# Patient Record
Sex: FEMALE | Race: BLACK OR AFRICAN AMERICAN
[De-identification: names, ages, dates, MRNs, and addresses within clinical notes are randomized per-mention and may not be internally consistent; named-entity substitution may affect disease eponyms.]

---

## 2017-10-13 ENCOUNTER — HOSPITAL ENCOUNTER (OUTPATIENT)
Dept: HOSPITAL 17 - PHSDC | Age: 47
Discharge: HOME | End: 2017-10-13
Payer: COMMERCIAL

## 2017-10-13 VITALS
DIASTOLIC BLOOD PRESSURE: 81 MMHG | RESPIRATION RATE: 16 BRPM | TEMPERATURE: 98 F | SYSTOLIC BLOOD PRESSURE: 138 MMHG | OXYGEN SATURATION: 95 %

## 2017-10-13 VITALS — BODY MASS INDEX: 31.31 KG/M2 | WEIGHT: 199.52 LBS | HEIGHT: 67 IN

## 2017-10-13 VITALS — HEART RATE: 60 BPM

## 2017-10-13 DIAGNOSIS — I10: ICD-10-CM

## 2017-10-13 DIAGNOSIS — E11.9: ICD-10-CM

## 2017-10-13 DIAGNOSIS — Z79.4: ICD-10-CM

## 2017-10-13 DIAGNOSIS — M65.331: Primary | ICD-10-CM

## 2017-10-13 PROCEDURE — 01810 ANES PX NRV MUSC F/ARM WRST: CPT

## 2017-10-13 PROCEDURE — 26055 INCISE FINGER TENDON SHEATH: CPT

## 2017-10-13 PROCEDURE — 82948 REAGENT STRIP/BLOOD GLUCOSE: CPT

## 2017-10-13 NOTE — MP
cc:

KIRBY DAVIDSON M.D.

****

 

 

DATE OF SURGERY

10/13/2017

 

SURGEON

Dr. Viral Davidson.

 

PREOPERATIVE DIAGNOSIS

Right middle trigger finger.

 

POSTOPERATIVE DIAGNOSIS

Right middle trigger finger.

 

PROCEDURE

Right trigger finger release, middle finger.

 

PROCEDURE IN DETAIL

The patient was placed on an operating room stretcher only and the right hand

was prepped and draped in the usual sterile fashion.  Esmarch bandage was used

to exsanguinate the upper extremity and pneumatic tourniquet was inflated to a

pressure of 250.  The time-out was called and the patient's name, procedure,

location were fully verified.  A transverse incision was made over the flexor

tendon sheath proximal to the distal crease at the MP joint.  The wound was

taken down carefully through the subcutaneous tissues using sharp and blunt

dissection. Care was taken to retract only proximal and distal to avoid any

possible stretching of the neurovascular bundles. The flexor tendon sheath was

identified and then pierced with a sharp scissor and then longitudinally opened

with the same instrument.  After full release I was able to bring the flexor

tendon out of the wound and it did appear to be free. No triggering was

present. Small bleeding points were electrocauterized.  The skin edges were

then reapproximated with two sutures of 4-0 nylon.  Xeroform gauze was applied

over the wound followed by application of a bulky hand dressing.  The procedure

was tolerated well.  Sponge count, needle counts reported correct x2 and the

estimated blood loss was nil.  The patient was transferred to recovery unit in

satisfactory condition.

 

 

 

                              _________________________________

                              Kirby Davidson MD

 

 

 

[facility]EL

D:  10/13/2017/1:35 PM

T:  10/13/2017/2:02 PM

Visit #:  [id number]

Job #:  [id number]

## 2017-12-17 ENCOUNTER — HOSPITAL ENCOUNTER (OUTPATIENT)
Dept: HOSPITAL 17 - NEPE | Age: 47
Setting detail: OBSERVATION
LOS: 1 days | Discharge: HOME | End: 2017-12-18
Payer: COMMERCIAL

## 2017-12-17 VITALS
RESPIRATION RATE: 18 BRPM | HEART RATE: 88 BPM | OXYGEN SATURATION: 99 % | TEMPERATURE: 98.8 F | SYSTOLIC BLOOD PRESSURE: 161 MMHG | DIASTOLIC BLOOD PRESSURE: 82 MMHG

## 2017-12-17 VITALS — HEIGHT: 67 IN | BODY MASS INDEX: 31.14 KG/M2 | WEIGHT: 198.42 LBS

## 2017-12-17 VITALS
SYSTOLIC BLOOD PRESSURE: 146 MMHG | DIASTOLIC BLOOD PRESSURE: 81 MMHG | OXYGEN SATURATION: 97 % | RESPIRATION RATE: 18 BRPM | HEART RATE: 60 BPM

## 2017-12-17 VITALS
SYSTOLIC BLOOD PRESSURE: 152 MMHG | HEART RATE: 54 BPM | RESPIRATION RATE: 16 BRPM | DIASTOLIC BLOOD PRESSURE: 77 MMHG | OXYGEN SATURATION: 95 %

## 2017-12-17 VITALS — OXYGEN SATURATION: 97 %

## 2017-12-17 DIAGNOSIS — E11.9: ICD-10-CM

## 2017-12-17 DIAGNOSIS — R07.9: Primary | ICD-10-CM

## 2017-12-17 DIAGNOSIS — K21.9: ICD-10-CM

## 2017-12-17 DIAGNOSIS — Z82.49: ICD-10-CM

## 2017-12-17 DIAGNOSIS — Z85.820: ICD-10-CM

## 2017-12-17 DIAGNOSIS — Z79.84: ICD-10-CM

## 2017-12-17 DIAGNOSIS — F40.240: ICD-10-CM

## 2017-12-17 DIAGNOSIS — Z90.710: ICD-10-CM

## 2017-12-17 DIAGNOSIS — E78.5: ICD-10-CM

## 2017-12-17 DIAGNOSIS — R94.31: ICD-10-CM

## 2017-12-17 DIAGNOSIS — I10: ICD-10-CM

## 2017-12-17 LAB
ALP SERPL-CCNC: 64 U/L (ref 45–117)
ALT SERPL-CCNC: 17 U/L (ref 10–53)
ANION GAP SERPL CALC-SCNC: 8 MEQ/L (ref 5–15)
APTT BLD: 25.2 SEC (ref 24.3–30.1)
AST SERPL-CCNC: 18 U/L (ref 15–37)
BASOPHILS # BLD AUTO: 0.1 TH/MM3 (ref 0–0.2)
BASOPHILS NFR BLD: 0.9 % (ref 0–2)
BILIRUB SERPL-MCNC: 0.3 MG/DL (ref 0.2–1)
BUN SERPL-MCNC: 15 MG/DL (ref 7–18)
CHLORIDE SERPL-SCNC: 106 MEQ/L (ref 98–107)
CK MB SERPL-MCNC: (no result) NG/ML (ref 0.5–3.6)
CK SERPL-CCNC: 153 U/L (ref 26–192)
EOSINOPHIL # BLD: 0.1 TH/MM3 (ref 0–0.4)
EOSINOPHIL NFR BLD: 1.3 % (ref 0–4)
ERYTHROCYTE [DISTWIDTH] IN BLOOD BY AUTOMATED COUNT: 13.7 % (ref 11.6–17.2)
GFR SERPLBLD BASED ON 1.73 SQ M-ARVRAT: 82 ML/MIN (ref 89–?)
HCO3 BLD-SCNC: 26.3 MEQ/L (ref 21–32)
HCT VFR BLD CALC: 31.2 % (ref 35–46)
HEMO FLAGS: (no result)
INR PPP: 1 RATIO
LYMPHOCYTES # BLD AUTO: 3.1 TH/MM3 (ref 1–4.8)
LYMPHOCYTES NFR BLD AUTO: 38.2 % (ref 9–44)
MAGNESIUM SERPL-MCNC: 2 MG/DL (ref 1.5–2.5)
MCH RBC QN AUTO: 27.4 PG (ref 27–34)
MCHC RBC AUTO-ENTMCNC: 32.3 % (ref 32–36)
MCV RBC AUTO: 84.9 FL (ref 80–100)
MONOCYTES NFR BLD: 5.6 % (ref 0–8)
NEUTROPHILS # BLD AUTO: 4.4 TH/MM3 (ref 1.8–7.7)
NEUTROPHILS NFR BLD AUTO: 54 % (ref 16–70)
PLATELET # BLD: 358 TH/MM3 (ref 150–450)
POTASSIUM SERPL-SCNC: 3.8 MEQ/L (ref 3.5–5.1)
PROTHROMBIN TIME: 10.1 SEC (ref 9.8–11.6)
RBC # BLD AUTO: 3.67 MIL/MM3 (ref 4–5.3)
SODIUM SERPL-SCNC: 140 MEQ/L (ref 136–145)
WBC # BLD AUTO: 8.1 TH/MM3 (ref 4–11)

## 2017-12-17 PROCEDURE — A9502 TC99M TETROFOSMIN: HCPCS

## 2017-12-17 PROCEDURE — 82552 ASSAY OF CPK IN BLOOD: CPT

## 2017-12-17 PROCEDURE — 93005 ELECTROCARDIOGRAM TRACING: CPT

## 2017-12-17 PROCEDURE — 99285 EMERGENCY DEPT VISIT HI MDM: CPT

## 2017-12-17 PROCEDURE — 80053 COMPREHEN METABOLIC PANEL: CPT

## 2017-12-17 PROCEDURE — 82550 ASSAY OF CK (CPK): CPT

## 2017-12-17 PROCEDURE — 71010: CPT

## 2017-12-17 PROCEDURE — G0378 HOSPITAL OBSERVATION PER HR: HCPCS

## 2017-12-17 PROCEDURE — 96375 TX/PRO/DX INJ NEW DRUG ADDON: CPT

## 2017-12-17 PROCEDURE — 85610 PROTHROMBIN TIME: CPT

## 2017-12-17 PROCEDURE — 78452 HT MUSCLE IMAGE SPECT MULT: CPT

## 2017-12-17 PROCEDURE — 96374 THER/PROPH/DIAG INJ IV PUSH: CPT

## 2017-12-17 PROCEDURE — 96372 THER/PROPH/DIAG INJ SC/IM: CPT

## 2017-12-17 PROCEDURE — 93017 CV STRESS TEST TRACING ONLY: CPT

## 2017-12-17 PROCEDURE — 83735 ASSAY OF MAGNESIUM: CPT

## 2017-12-17 PROCEDURE — 85025 COMPLETE CBC W/AUTO DIFF WBC: CPT

## 2017-12-17 PROCEDURE — 85730 THROMBOPLASTIN TIME PARTIAL: CPT

## 2017-12-17 PROCEDURE — 83690 ASSAY OF LIPASE: CPT

## 2017-12-17 PROCEDURE — 84484 ASSAY OF TROPONIN QUANT: CPT

## 2017-12-17 PROCEDURE — 82948 REAGENT STRIP/BLOOD GLUCOSE: CPT

## 2017-12-17 RX ADMIN — NITROGLYCERIN SCH MG: 0.4 TABLET SUBLINGUAL at 20:50

## 2017-12-17 RX ADMIN — NITROGLYCERIN SCH MG: 0.4 TABLET SUBLINGUAL at 20:40

## 2017-12-17 NOTE — PD
HPI


Chief Complaint:  Chest Pain


Time Seen by Provider:  20:30


Travel History


International Travel<30 days:  No


Contact w/Intl Traveler<30days:  No


Traveled to known affect area:  No





History of Present Illness


HPI


Patient is a 47-year-old female presenting to emergency department for 

evaluation of chest pain.  Patient states started earlier today, she initially 

presented to the [facility] emergency Department where she had an EKG and labs 

performed, they gave her the option of being admitted or followed up with 

cardiology as outpatient.  She states that she felt well enough to follow up 

however 45 minutes prior to arrival to [place] she began to have midsternal 

chest pain that radiates to her left arm again.  She states that she just did 

not feel good and didn't think she would make it to the other hospital.  She 

denies any nausea, vomiting, diaphoresis, headache, palpitations.  She does 

report that she felt dizzy.  Patient was discharged from [facility] at 8:00 this 

evening.  She does report a family history of early heart disease, she states 

also her parents  in their late 40s, early 50s from a heart attack.  

Patient does not smoke, she has a glass of wine occasionally denies any illicit 

drug use.  Her past medical history is significant for hyperlipidemia, type 2 

diabetes, hypertension.  She was given 4 baby aspirin in [facility].





[facility]


Past Medical History


Cancer:  Yes (MELANOMA )


High Cholesterol:  Yes


Diabetes:  Yes


Patient Takes Glucophage:  Yes


GERD:  Yes


Hypertension:  Yes


Psychiatric:  Yes (CLAUSTROPHOBIC)


Immunizations Current:  Yes


Pregnant?:  Not Pregnant





Past Surgical History


Gynecologic Surgery:  Yes (PARTIAL HYSTERECTOMY)


Hysterectomy:  Yes (PARTIAL)


Other Surgery:  Yes (CYST REMOVAL RIGHT WRIST)





Social History


Alcohol Use:  No


Tobacco Use:  No


Substance Use:  No





Allergies-Medications


(Allergen,Severity, Reaction):  


Coded Allergies:  


     acetaminophen (Unverified  Allergy, Intermediate, VOMITTING, 17)


 PATIENT DENIES


     ondansetron (Unverified  Allergy, Intermediate, HIVES, 17)


     propoxyphene (Unverified  Allergy, Intermediate, VOMITTING, 17)


 PATIENT DENIES


     morphine (Unverified  Adverse Reaction, Mild, VOMITTING, 17)


Uncoded Allergies:  


     STERI STRIPS (Allergy, Severe, BLISTERS, 10/13/17)


Reported Meds & Prescriptions





Reported Meds & Active Scripts


Active


Reported


Simvastatin 40 Mg Tab 40 Mg PO HS


Lantus Inj (Insulin Glargine) 1,000 Unit/10 Ml Vial 1 Units SQ HS


Metformin (Metformin HCl) 850 Mg Tab 850 Mg PO BIDPC


Metoprolol Tartrate 100 Mg Tab 100 Mg PO DAILY


Lisinopril 40 Mg Tab 40 Mg PO DAILY








Review of Systems


Except as stated in HPI:  all other systems reviewed are Neg


General / Constitutional:  No: Fever, Chills


Eyes:  No: Blurred Vision


HENT:  No: Headaches


Cardiovascular:  Positive: Chest Pain or Discomfort, No: Dyspnea on exertion


Respiratory:  No: Shortness of Breath


Gastrointestinal:  No: Nausea, Abdominal Pain


Musculoskeletal:  No: Myalgias





Physical Exam


Narrative


GENERAL: Well-developed, well-nourished, alert  female.  

Resting comfortably in no acute distress.


SKIN: Warm and dry.


HEAD: Atraumatic. Normocephalic. 


EYES: Pupils equal and round. No scleral icterus. No injection or drainage. 


ENT: No nasal bleeding or discharge.  Mucous membranes pink and moist.


NECK: Trachea midline. No JVD. 


CARDIOVASCULAR: Regular rate and rhythm.  


RESPIRATORY: No accessory muscle use. Clear to auscultation. Breath sounds 

equal bilaterally. 


GASTROINTESTINAL: Abdomen soft, non-tender, nondistended. Hepatic and splenic 

margins not palpable. 


MUSCULOSKELETAL: Extremities without clubbing, cyanosis, or edema. No obvious 

deformities. 


NEUROLOGICAL: Awake and alert. No obvious cranial nerve deficits.  Motor 

grossly within normal limits. Five out of 5 muscle strength in the arms and 

legs.  Normal speech.


PSYCHIATRIC: Appropriate mood and affect; insight and judgment normal.





Data


Data


Last Documented VS





Vital Signs








  Date Time  Temp Pulse Resp B/P (MAP) Pulse Ox O2 Delivery O2 Flow Rate FiO2


 


17 20:53  60 18 146/81 (102) 97 Room Air  


 


17 20:19 98.8       








Orders





 Orders


Electrocardiogram (17 20:36)


Ckmb (Isoenzyme) Profile (17 20:36)


Complete Blood Count With Diff (17 20:36)


Comprehensive Metabolic Panel (17 20:36)


Magnesium (Mg) (17 20:36)


Prothrombin Time / Inr (Pt) (17 20:36)


Act Partial Throm Time (Ptt) (17 20:36)


Troponin I (17 20:36)


Lipase (17 20:36)


Chest, Single Ap (17 20:36)


Ecg Monitoring (17 20:36)


Bilateral Bp Monitoring (17 20:36)


Iv Access Insert/Monitor (17 20:36)


Oximetry (17 20:36)


Oxygen Administration (17 20:36)


Sodium Chloride 0.9% Flush (Ns Flush) (17 20:45)


Nitroglycerin Sl (Nitrostat Sl) (17 20:45)


Morphine Inj (Morphine Inj) (17 22:15)


Prochlorperazine Inj (Compazine Inj) (17 22:15)


CKMB (17 21:44)


CKMB% (17 21:44)


Admit Order (Ed Use Only) (17 23:06)





Labs





Laboratory Tests








Test


  17


21:44


 


White Blood Count 8.1 TH/MM3 


 


Red Blood Count 3.67 MIL/MM3 


 


Hemoglobin 10.1 GM/DL 


 


Hematocrit 31.2 % 


 


Mean Corpuscular Volume 84.9 FL 


 


Mean Corpuscular Hemoglobin 27.4 PG 


 


Mean Corpuscular Hemoglobin


Concent 32.3 % 


 


 


Red Cell Distribution Width 13.7 % 


 


Platelet Count 358 TH/MM3 


 


Mean Platelet Volume 7.3 FL 


 


Neutrophils (%) (Auto) 54.0 % 


 


Lymphocytes (%) (Auto) 38.2 % 


 


Monocytes (%) (Auto) 5.6 % 


 


Eosinophils (%) (Auto) 1.3 % 


 


Basophils (%) (Auto) 0.9 % 


 


Neutrophils # (Auto) 4.4 TH/MM3 


 


Lymphocytes # (Auto) 3.1 TH/MM3 


 


Monocytes # (Auto) 0.5 TH/MM3 


 


Eosinophils # (Auto) 0.1 TH/MM3 


 


Basophils # (Auto) 0.1 TH/MM3 


 


CBC Comment DIFF FINAL 


 


Differential Comment  


 


Prothrombin Time 10.1 SEC 


 


Prothromb Time International


Ratio 1.0 RATIO 


 


 


Activated Partial


Thromboplast Time 25.2 SEC 


 


 


Blood Urea Nitrogen 15 MG/DL 


 


Creatinine 0.89 MG/DL 


 


Random Glucose 88 MG/DL 


 


Total Protein 7.9 GM/DL 


 


Albumin 3.7 GM/DL 


 


Calcium Level 8.7 MG/DL 


 


Magnesium Level 2.0 MG/DL 


 


Alkaline Phosphatase 64 U/L 


 


Aspartate Amino Transf


(AST/SGOT) 18 U/L 


 


 


Alanine Aminotransferase


(ALT/SGPT) 17 U/L 


 


 


Total Bilirubin 0.3 MG/DL 


 


Sodium Level 140 MEQ/L 


 


Potassium Level 3.8 MEQ/L 


 


Chloride Level 106 MEQ/L 


 


Carbon Dioxide Level 26.3 MEQ/L 


 


Anion Gap 8 MEQ/L 


 


Estimat Glomerular Filtration


Rate 82 ML/MIN 


 


 


Total Creatine Kinase 153 U/L 


 


Creatine Kinase MB


  LESS THAN 0.5


NG/ML


 


Troponin I


  LESS THAN 0.02


NG/ML


 


Lipase 138 U/L 











MDM


Medical Decision Making


Medical Screen Exam Complete:  Yes


Emergency Medical Condition:  Yes


Interpretation(s)





Last Impressions








Chest X-Ray 17 Signed





Impressions: 





 Service Date/Time:  2017 21:00 - CONCLUSION:  No acute 





 cardiopulmonary process. There is suspected chronic change at the distal right 





 clavicle.     Jesse Junior MD 








Vital Signs








  Date Time  Temp Pulse Resp B/P (MAP) Pulse Ox O2 Delivery O2 Flow Rate FiO2


 


17 20:53  60 18 146/81 (102) 97 Room Air  


 


17 20:19 98.8 88 18 161/82 (108) 99 Room Air  








Differential Diagnosis


ACS versus USA versus NSTEMI versus metabolic abnormality versus other


Narrative Course


Patient is a 47-year-old female presenting for evaluation of chest pain.  She 

was just discharged from [facility] at 8 PM, the chest pain returned and 

she presented here.  Patient was hypertensive on arrival, labs and imaging 

ordered and pending.  Nitroglycerin sublingually was ordered 2 doses for chest 

pain.  She was already given 4 baby aspirin at [facility] this morning per her 

report.  Patient has significant family history as well as her known history of 

hypertension and type 2 diabetes, hyperlipidemia provider at increased risk for 

cardiovascular disease.  Visual likely be placed and chest pain Center overnight

, patient is agreeable to this plan.  Initial EKG shows him, possible left 

atrial enlargement.  Ventricular rate is 62.


Chest x-ray shows no acute disease, suspected right old clavicle fracture.


 patient was reassessed, nitroglycerin did not alleviate patient's chest 

pain.  Morphine and Compazine ordered for pain.  Morphine is listed as an 

allergy however patient states it makes her feel nauseated.


Chemistry and enzymes are pending, care of pt transferred to Dr. Mejia who 

will determine pt's disposition. Pt agreeable to [facility] admit.











Narcisa Meyer Dec 17, 2017 21:11

## 2017-12-17 NOTE — RADRPT
EXAM DATE/TIME:  12/17/2017 21:00 

 

[facility] COMPARISON:     

No previous studies available for comparison.

 

                     

INDICATIONS :     

Chest Pain

                     

 

MEDICAL HISTORY :     

None.          

 

SURGICAL HISTORY :     

None.   

 

ENCOUNTER:     

Initial                                        

 

ACUITY:     

1 day      

 

PAIN SCORE:     

6/10

 

LOCATION:     

Bilateral chest 

 

FINDINGS:     

A single view of the chest demonstrates the lungs to be symmetrically aerated without evidence of mas
s, infiltrate or effusion.  The cardiomediastinal contours are unremarkable. There appears to be abse
nce of the distal right clavicle which is likely from prior injury. There is widening of the acromioc
lavicular joint on the right.

 

CONCLUSION:     

No acute cardiopulmonary process. There is suspected chronic change at the distal right clavicle.

 

 

 

 Jesse Junior MD on December 17, 2017 at 21:13           

Board Certified Radiologist.

 This report was verified electronically.

## 2017-12-17 NOTE — PD
HPI


Chief Complaint:  Chest Pain


Time Seen by Provider:  20:28


Travel History


International Travel<30 days:  No


Contact w/Intl Traveler<30days:  No


Traveled to known affect area:  No





[facility]


Past Medical History


Cancer:  Yes (MELANOMA 2002)


Cardiovascular Problems:  No


High Cholesterol:  Yes


Diabetes:  Yes


Patient Takes Glucophage:  Yes


Endocrine:  No


GERD:  Yes


Genitourinary:  No


Hepatitis:  No


Hiatal Hernia:  No


Hypertension:  Yes


Immune Disorder:  No


Musculoskeletal:  No


Neurologic:  No


Psychiatric:  Yes (CLAUSTROPHOBIC)


Reproductive:  No


Respiratory:  No


Immunizations Current:  Yes


Thyroid Disease:  No


Pregnant?:  Not Pregnant





Past Surgical History


Abdominal Surgery:  No


AICD:  No


Cardiac Surgery:  No


Ear Surgery:  No


Endocrine Surgery:  No


Eye Surgery:  No


Genitourinary Surgery:  No


Gynecologic Surgery:  Yes (PARTIAL HYSTERECTOMY)


Hysterectomy:  Yes (PARTIAL)


Joint Replacement:  No


Oral Surgery:  No


Pacemaker:  No


Thoracic Surgery:  No


Other Surgery:  Yes (CYST REMOVAL RIGHT WRIST)





Social History


Alcohol Use:  No


Tobacco Use:  No


Substance Use:  No





Allergies-Medications


(Allergen,Severity, Reaction):  


Coded Allergies:  


     acetaminophen (Unverified  Allergy, Intermediate, VOMITTING, 12/17/17)


 PATIENT DENIES


     ondansetron (Unverified  Allergy, Intermediate, HIVES, 12/17/17)


     propoxyphene (Unverified  Allergy, Intermediate, VOMITTING, 12/17/17)


 PATIENT DENIES


     morphine (Unverified  Adverse Reaction, Mild, VOMITTING, 12/17/17)


Uncoded Allergies:  


     STERI STRIPS (Allergy, Severe, BLISTERS, 10/13/17)


Reported Meds & Prescriptions





Reported Meds & Active Scripts


Active


Reported


Simvastatin 40 Mg Tab 40 Mg PO HS


Lantus Inj (Insulin Glargine) 1,000 Unit/10 Ml Vial 1 Units SQ HS


Metformin (Metformin HCl) 850 Mg Tab 850 Mg PO BIDPC


Metoprolol Tartrate 100 Mg Tab 100 Mg PO DAILY


Lisinopril 40 Mg Tab 40 Mg PO DAILY








Data


Data


Last Documented VS





Vital Signs








  Date Time  Temp Pulse Resp B/P (MAP) Pulse Ox O2 Delivery O2 Flow Rate FiO2


 


12/17/17 20:19 98.8 88 18 161/82 (108) 99 Room Air  








Orders





 Orders


Electrocardiogram (12/17/17 20:36)


Ckmb (Isoenzyme) Profile (12/17/17 20:36)


Complete Blood Count With Diff (12/17/17 20:36)


Comprehensive Metabolic Panel (12/17/17 20:36)


Magnesium (Mg) (12/17/17 20:36)


Prothrombin Time / Inr (Pt) (12/17/17 20:36)


Act Partial Throm Time (Ptt) (12/17/17 20:36)


Troponin I (12/17/17 20:36)


Lipase (12/17/17 20:36)


Chest, Single Ap (12/17/17 20:36)


Ecg Monitoring (12/17/17 20:36)


Bilateral Bp Monitoring (12/17/17 20:36)


Iv Access Insert/Monitor (12/17/17 20:36)


Oximetry (12/17/17 20:36)


Oxygen Administration (12/17/17 20:36)


Sodium Chloride 0.9% Flush (Ns Flush) (12/17/17 20:45)


Nitroglycerin Sl (Nitrostat Sl) (12/17/17 20:45)














Michelle Mejia MD Dec 17, 2017 20:46

## 2017-12-17 NOTE — PD
Physical Exam


Narrative


General: 


The patient is a well-developed well-nourished female in no acute distress. 





Head and Neck exam: 


Head is normocephalic atraumatic. 


Eyes: Pupils are equal round and reactive to light. 


Nose: Midline septum with pink mucous membranes 


Mouth: Dentition unremarkable. Moist mucus membranes. Posterior oropharynx is 

not erythematous. No tonsillar hypertrophy. Uvula midline. Airway patent. 


Neck: No palpable lymphadenopathy. No nuchal rigidity. No thyromegaly. 





Cardiovascular: 


Regular rate and rhythm without murmurs, gallops, or rubs. No pulse deficit to 

the extremities on simultaneous auscultation and palpation of her radial 

artery. 





Lungs: 


Clear to auscultation bilaterally. No wheezes, rhonchi, or rales.


 


Abdomen:


Soft, without tenderness to palpation in all 4 quadrants of the abdomen. No 

guarding, rebound, or rigidity.  Normal bowel sounds are audible.  No 

tenderness on palpation of McBurney's point.





Extremities: 


No clubbing, cyanosis, or edema. 2+ pulses in all 4 extremities.  Tenderness on 

palpation.





Neurologic Exam: Grossly nonfocal.





Skin Exam: No rash noted. Intact skin that is warm and dry.





Data


Data


Last Documented VS





Vital Signs








  Date Time  Temp Pulse Resp B/P (MAP) Pulse Ox O2 Delivery O2 Flow Rate FiO2


 


12/17/17 20:53  60 18 146/81 (102) 97 Room Air  


 


12/17/17 20:19 98.8       








Orders





 Orders


Electrocardiogram (12/17/17 20:36)


Ckmb (Isoenzyme) Profile (12/17/17 20:36)


Complete Blood Count With Diff (12/17/17 20:36)


Comprehensive Metabolic Panel (12/17/17 20:36)


Magnesium (Mg) (12/17/17 20:36)


Prothrombin Time / Inr (Pt) (12/17/17 20:36)


Act Partial Throm Time (Ptt) (12/17/17 20:36)


Troponin I (12/17/17 20:36)


Lipase (12/17/17 20:36)


Chest, Single Ap (12/17/17 20:36)


Ecg Monitoring (12/17/17 20:36)


Bilateral Bp Monitoring (12/17/17 20:36)


Iv Access Insert/Monitor (12/17/17 20:36)


Oximetry (12/17/17 20:36)


Oxygen Administration (12/17/17 20:36)


Sodium Chloride 0.9% Flush (Ns Flush) (12/17/17 20:45)


Nitroglycerin Sl (Nitrostat Sl) (12/17/17 20:45)


Morphine Inj (Morphine Inj) (12/17/17 22:15)


Prochlorperazine Inj (Compazine Inj) (12/17/17 22:15)


CKMB (12/17/17 21:44)


CKMB% (12/17/17 21:44)


Admit Order (Ed Use Only) (12/17/17 23:06)





Labs





Laboratory Tests








Test


  12/17/17


21:44


 


White Blood Count 8.1 TH/MM3 


 


Red Blood Count 3.67 MIL/MM3 


 


Hemoglobin 10.1 GM/DL 


 


Hematocrit 31.2 % 


 


Mean Corpuscular Volume 84.9 FL 


 


Mean Corpuscular Hemoglobin 27.4 PG 


 


Mean Corpuscular Hemoglobin


Concent 32.3 % 


 


 


Red Cell Distribution Width 13.7 % 


 


Platelet Count 358 TH/MM3 


 


Mean Platelet Volume 7.3 FL 


 


Neutrophils (%) (Auto) 54.0 % 


 


Lymphocytes (%) (Auto) 38.2 % 


 


Monocytes (%) (Auto) 5.6 % 


 


Eosinophils (%) (Auto) 1.3 % 


 


Basophils (%) (Auto) 0.9 % 


 


Neutrophils # (Auto) 4.4 TH/MM3 


 


Lymphocytes # (Auto) 3.1 TH/MM3 


 


Monocytes # (Auto) 0.5 TH/MM3 


 


Eosinophils # (Auto) 0.1 TH/MM3 


 


Basophils # (Auto) 0.1 TH/MM3 


 


CBC Comment DIFF FINAL 


 


Differential Comment  


 


Prothrombin Time 10.1 SEC 


 


Prothromb Time International


Ratio 1.0 RATIO 


 


 


Activated Partial


Thromboplast Time 25.2 SEC 


 


 


Blood Urea Nitrogen 15 MG/DL 


 


Creatinine 0.89 MG/DL 


 


Random Glucose 88 MG/DL 


 


Total Protein 7.9 GM/DL 


 


Albumin 3.7 GM/DL 


 


Calcium Level 8.7 MG/DL 


 


Magnesium Level 2.0 MG/DL 


 


Alkaline Phosphatase 64 U/L 


 


Aspartate Amino Transf


(AST/SGOT) 18 U/L 


 


 


Alanine Aminotransferase


(ALT/SGPT) 17 U/L 


 


 


Total Bilirubin 0.3 MG/DL 


 


Sodium Level 140 MEQ/L 


 


Potassium Level 3.8 MEQ/L 


 


Chloride Level 106 MEQ/L 


 


Carbon Dioxide Level 26.3 MEQ/L 


 


Anion Gap 8 MEQ/L 


 


Estimat Glomerular Filtration


Rate 82 ML/MIN 


 


 


Total Creatine Kinase 153 U/L 


 


Creatine Kinase MB


  LESS THAN 0.5


NG/ML


 


Troponin I


  LESS THAN 0.02


NG/ML


 


Lipase 138 U/L 











[facility]


Medical Record Reviewed:  Yes


Supervised Visit with ROSALINA:  Yes


Interpretation(s)





Last Impressions








Chest X-Ray 12/17/17 2036 Signed





Impressions: 





 Service Date/Time:  Sunday, December 17, 2017 21:00 - CONCLUSION:  No acute 





 cardiopulmonary process. There is suspected chronic change at the distal right 





 clavicle.     Jesse Junior MD 








Differential Diagnosis


Acute coronary syndrome, versus acid reflux, versus anxiety disorder, versus 

pleurisy, versus costochondritis, versus musculoskeletal strain


Narrative Course


I, Dr. Mejia, have reviewed the advance practice practitioner's documentation 

and am in agreement, met with the patient face to face, made the diagnosis, and 

the medical decision making was done by me.  The patient was initially 

evaluated by Narcisa, the nurse practitioner.  Please see their complete history 

and physical.





*My assessment and Findings: The patient presents with chest pain that began





During the course of the patients emergency department visit, the patients 

history, examination, and differential diagnosis were reviewed with the 

patient. The patient was placed on a cardiac monitor with oximetry and frequent 

blood pressure monitoring. The patient had IV access obtained and blood work 

sent for analysis. 





The patient was initially provided sublingual nitroglycerin 2, Compazine 5 mg 

IV, morphine 2 mg IV.





The patient's case was checked out to me at the conclusion of Narcisa shift 

pending chemistry results.  She plan to admit the patient to the chest pain 

center once these results were completed.





The patients laboratory studies were reviewed and remarkable for a white count 

of 8.1, hemoglobin 10.1, platelets 358 with a normal differential, CMP is 

remarkable for a GFR of 82, initial set of cardiac enzymes are negative, lipase 

138, PT PTT within normal limits





Radiology studies were reviewed and remarkable for a chest x-ray that shows no 

acute cardiopulmonary disease, suspected chronic change of the distal right 

clavicle.





The patients results were discussed with the patient, including the plan of 

care. I explained that further testing and/ or monitoring is indicated based on 

the patients history, examination, and/ or laboratory findings. Therefore, I 

recommended admission for additional evaluation. The patient expressed 

understanding and was agreeable with this plan. The patient was admitted to the 

hospital in stable condition and sent to a bed under the care of chest pain 

center.


Diagnosis





 Primary Impression:  


 Chest pain, rule out acute myocardial infarction





Admitting Information


Admitting Physician Requests:  Observation











Michelle Mejia MD Dec 17, 2017 22:52

## 2017-12-18 VITALS
HEART RATE: 62 BPM | SYSTOLIC BLOOD PRESSURE: 121 MMHG | DIASTOLIC BLOOD PRESSURE: 76 MMHG | OXYGEN SATURATION: 99 % | RESPIRATION RATE: 18 BRPM | TEMPERATURE: 98 F

## 2017-12-18 VITALS
SYSTOLIC BLOOD PRESSURE: 113 MMHG | HEART RATE: 53 BPM | RESPIRATION RATE: 18 BRPM | DIASTOLIC BLOOD PRESSURE: 66 MMHG | OXYGEN SATURATION: 94 % | TEMPERATURE: 98.1 F

## 2017-12-18 VITALS
RESPIRATION RATE: 18 BRPM | DIASTOLIC BLOOD PRESSURE: 65 MMHG | OXYGEN SATURATION: 95 % | SYSTOLIC BLOOD PRESSURE: 115 MMHG | HEART RATE: 54 BPM | TEMPERATURE: 98.1 F

## 2017-12-18 VITALS — HEART RATE: 51 BPM

## 2017-12-18 VITALS — HEART RATE: 56 BPM

## 2017-12-18 LAB
CK MB SERPL-MCNC: 0.8 NG/ML (ref 0.5–3.6)
CK MB SERPL-MCNC: 0.8 NG/ML (ref 0.5–3.6)
CK SERPL-CCNC: 240 U/L (ref 26–192)
CK SERPL-CCNC: 247 U/L (ref 26–192)

## 2017-12-18 NOTE — HHI.DCPOC
Discharge Care Plan


Diagnosis:  


(1) Chest pain


(2) Hypertension


(3) Hyperlipidemia


(4) DM (diabetes mellitus)


Goals to Promote Your Health


* To prevent worsening of your condition and complications


* To maintain your health at the optimal level


Directions to Meet Your Goals


*** Take your medications as prescribed


*** Follow your dietary instruction


*** Follow activity as directed








*** Keep your appointments as scheduled


*** Take your immunizations and boosters as scheduled


*** If your symptoms worsen call your PCP, if no PCP go to Urgent Care Center 

or Emergency Room***


*** Smoking is Dangerous to Your Health. Avoid second hand smoke***


***Call the 24-hour hour crisis hotline for domestic abuse at 1-498.970.5609***











Heath Toney Dec 18, 2017 11:38

## 2017-12-18 NOTE — HHI.HP
[facility]


Primary Care Physician


Miguel Rodriguez MD


Chief Complaint


Chest pain


History of Present Illness


This is a 47-year-old female with history of hypertension, hyperlipidemia, and 

diabetes that presents to ED for evaluation of chest discomfort.  Patient 

states that she has had a central chest discomfort yesterday lasting about 5 

minutes.  She went [facility] for this, had labs and EKG and was 

discharged.  She states that within less than an hour of discharge she 

developed same discomfort in the center of her chest also lasting about 5 

minutes with shortness of breath and decided to get reevaluated.  No nausea or 

diaphoresis.  Found nothing in particular bring on the discomfort.  Nothing 

seemed to worsen it.  Nothing seemed to help.  Cannot recall prior cardiac 

evaluation.





Review of Systems


General: Patient denies fevers, chills recent, and recent travel


HEENT: Patient denies headache, sore throat, difficulty swallowing.  


Cardiovascular: Has the chest discomfort as mentioned above.  Denies sensation 

of heart beating rapidly or irregularly.  No syncope.  Denies diaphoresis.


Respiratory: Had shortness of breath.  Denies inspirational chest discomfort.  

Denies coughing wheezing or hemoptysis.  


GI: Patient denies nausea, vomiting, diarrhea, abdominal pain, bloody stools.


Musculoskeletal: Patient denies joint pain or edema.  Denies calf pain or edema.


Neurovascular: Patient denies numbness, tingling, weakness in extremities.  

Denies headache.


Endocrine: Denies polyuria and polydipsia.


Hematologic: Denies easy bruising.


Skin: Denies rash or itching.





Past Family Social History


Allergies:  


Coded Allergies:  


     acetaminophen (Unverified  Allergy, Intermediate, VOMITTING, 17)


 PATIENT DENIES


     ondansetron (Unverified  Allergy, Intermediate, HIVES, 17)


     propoxyphene (Unverified  Allergy, Intermediate, VOMITTING, 17)


 PATIENT DENIES


     morphine (Unverified  Adverse Reaction, Mild, VOMITTING, 17)


Uncoded Allergies:  


     STERI STRIPS (Allergy, Severe, BLISTERS, 10/13/17)


Past Medical History


Hypertension, hyperlipidemia, and diabetes.


Past Surgical History


Hysterectomy.  Excision of a melanoma right thigh .  Both knees.


Reported Medications





Reported Meds & Active Scripts


Active


Reported


Simvastatin 40 Mg Tab 40 Mg PO HS


Lantus Inj (Insulin Glargine) 1,000 Unit/10 Ml Vial 1 Units SQ HS


Metformin (Metformin HCl) 850 Mg Tab 850 Mg PO BIDPC


Metoprolol Tartrate 100 Mg Tab 100 Mg PO DAILY


Lisinopril 40 Mg Tab 40 Mg PO DAILY


Active Ordered Medications





Current Medications








 Medications


  (Trade)  Dose


 Ordered  Sig/Carol


 Route  Start Time


 Stop Time Status Last Admin


 


  (NS Flush)  2 ml  UNSCH  PRN


 IVF  17 20:45


     


 


 


  (NS Flush)  2 ml  UNSCH  PRN


 IV FLUSH  17 23:30


     


 


 


  (NS Flush)  2 ml  BID


 IV FLUSH  17 09:00


     


 


 


  (Lopressor)  100 mg  DAILY


 PO  17 09:00


   UNV  


 


 


 Non-Formulary


 Medication  40 mg  DAILY


 PO  17 09:00


   UNV  


 


 


 Non-Formulary


 Medication  40 mg  HS


 PO  17 21:00


   UNV  


 


 


  (Toradol Inj)  30 mg  ONCE  ONCE


 IVP  17 08:30


 17 08:31 UNV  


 


 


  (D50w (Vial) Inj)  50 ml  UNSCH  PRN


 IV PUSH  17 08:30


   UNV  


 


 


  (Glucagon Inj)  1 mg  UNSCH  PRN


 OTHER  17 08:30


   UNV  


 


 


  (NovoLOG


 SUPPLEMENTAL


 SCALE)  1  ACHS SLIDING  SCALE


 SQ  17 12:00


   UNV  


 








Family History


Both parents had CAD in their 40s.


Social History


She does not smoke.  Denies illicit drug use.  Denies alcohol abuse.





Physical Exam


Vital Signs





Vital Signs








  Date Time  Temp Pulse Resp B/P (MAP) Pulse Ox O2 Delivery O2 Flow Rate FiO2


 


17 04:48  56      


 


17 03:39 98.1 54 18 115/65 (82) 95   


 


17 01:21  51      


 


17 00:31 98.1 53 18 113/66 (82) 94   


 


17 23:58        


 


17 23:40     97   21


 


17 23:37  54 16 152/77 (102) 95 Room Air  


 


17 20:53  60 18 146/81 (102) 97 Room Air  


 


17 20:19 98.8 88 18 161/82 (108) 99 Room Air  








Physical Exam


GENERAL: This is a well-nourished, well-developed patient, in no apparent 

distress.  Patient speaks in clear complete sentences.  Patient is pleasant.


HEENT: Head is atraumatic and normocephalic.  Neck is supple without 

lymphadenopathy and trachea is midline.  No JVD or carotid bruits.


CARDIOVASCULAR: Regular rate and rhythm without murmurs, gallops, or rubs. 


RESPIRATORY: Clear to auscultation. Breath sounds equal bilaterally. No wheezes

, rales, or rhonchi.  Chest wall is nontender.  No use of accessory muscles.


GASTROINTESTINAL: Abdomen is nontender, nondistended.  Abdomen soft.  No 

obvious pulsatile mass or bruit.  No CVA tenderness.  Strong femoral pulses 

bilaterally.  Normal bowel sounds in all quadrants.


MUSCULOSKELETAL: Patient is moving upper and lower extremities freely.  No calf 

tenderness or edema, no Homans sign.  Strong pulses in upper and lower 

extremities.


NEUROLOGICAL: Patient is alert and oriented.  Cranial nerves 2-12 are grossly 

intact.  No focal deficits and speech is clear.


SKIN: No rash and turgor is normal.


Laboratory





Laboratory Tests








Test


  17


21:44 17


01:00 17


03:56


 


White Blood Count 8.1   


 


Red Blood Count 3.67   


 


Hemoglobin 10.1   


 


Hematocrit 31.2   


 


Mean Corpuscular Volume 84.9   


 


Mean Corpuscular Hemoglobin 27.4   


 


Mean Corpuscular Hemoglobin


Concent 32.3 


  


  


 


 


Red Cell Distribution Width 13.7   


 


Platelet Count 358   


 


Mean Platelet Volume 7.3   


 


Neutrophils (%) (Auto) 54.0   


 


Lymphocytes (%) (Auto) 38.2   


 


Monocytes (%) (Auto) 5.6   


 


Eosinophils (%) (Auto) 1.3   


 


Basophils (%) (Auto) 0.9   


 


Neutrophils # (Auto) 4.4   


 


Lymphocytes # (Auto) 3.1   


 


Monocytes # (Auto) 0.5   


 


Eosinophils # (Auto) 0.1   


 


Basophils # (Auto) 0.1   


 


CBC Comment DIFF FINAL   


 


Differential Comment    


 


Prothrombin Time 10.1   


 


Prothromb Time International


Ratio 1.0 


  


  


 


 


Activated Partial


Thromboplast Time 25.2 


  


  


 


 


Blood Urea Nitrogen 15   


 


Creatinine 0.89   


 


Random Glucose 88   


 


Total Protein 7.9   


 


Albumin 3.7   


 


Calcium Level 8.7   


 


Magnesium Level 2.0   


 


Alkaline Phosphatase 64   


 


Aspartate Amino Transf


(AST/SGOT) 18 


  


  


 


 


Alanine Aminotransferase


(ALT/SGPT) 17 


  


  


 


 


Total Bilirubin 0.3   


 


Sodium Level 140   


 


Potassium Level 3.8   


 


Chloride Level 106   


 


Carbon Dioxide Level 26.3   


 


Anion Gap 8   


 


Estimat Glomerular Filtration


Rate 82 


  


  


 


 


Total Creatine Kinase 153  247  240 


 


Creatine Kinase MB LESS THAN 0.5  0.8  0.8 


 


Troponin I LESS THAN 0.02  LESS THAN 0.02  LESS THAN 0.02 


 


Lipase 138   


 


Creatine Kinase MB %  0.3  0.3 








Result Diagram:  


17





Imaging





Last 24 hours Impressions








Chest X-Ray 17 Signed





Impressions: 





 Service Date/Time:  2017 21:00 - CONCLUSION:  No acute 





 cardiopulmonary process. There is suspected chronic change at the distal right 





 clavicle.     Jesse Junior MD 








Course


EKGs are sinus rhythm to sinus bradycardia without significant ST segment 

depressions or elevations.





Caprini VTE Risk Assessment


Caprini VTE Risk Assessment:  No/Low Risk (score <= 1)


Caprini Risk Assessment Model











 Point Value = 1          Point Value = 2  Point Value = 3  Point Value = 5


 


Age 41-60


Minor surgery


BMI > 25 kg/m2


Swollen legs


Varicose veins


Pregnancy or postpartum


History of unexplained or recurrent


   spontaneous 


Oral contraceptives or hormone


   replacement


Sepsis (< 1 month)


Serious lung disease, including


   pneumonia (< 1 month)


Abnormal pulmonary function


Acute myocardial infarction


Congestive heart failure (< 1 month)


History of inflammatory bowel disease


Medical patient at bed rest Age 61-74


Arthroscopic surgery


Major open surgery (> 45 min)


Laparoscopic surgery (> 45 min)


Malignancy


Confined to bed (> 72 hours)


Immobilizing plaster cast


Central venous access Age >= 75


History of VTE


Family history of VTE


Factor V Leiden


Prothrombin 14576D


Lupus anticoagulant


Anticardiolipin antibodies


Elevated serum homocysteine


Heparin-induced thrombocytopenia


Other congenital or acquired


   thrombophilia Stroke (< 1 month)


Elective arthroplasty


Hip, pelvis, or leg fracture


Acute spinal cord injury (< 1 month)








Prophylaxis Regimen











   Total Risk


Factor Score Risk Level Prophylaxis Regimen


 


0-1      Low Early ambulation


 


2 Moderate Order ONE of the following:


*Sequential Compression Device (SCD)


*Heparin 5000 units SQ BID


 


3-4 Higher Order ONE of the following medications:


*Heparin 5000 units SQ TID


*Enoxaparin/Lovenox 40 mg SQ daily (WT < 150 kg, CrCl > 30 mL/min)


*Enoxaparin/Lovenox 30 mg SQ daily (WT < 150 kg, CrCl > 10-29 mL/min)


*Enoxaparin/Lovenox 30 mg SQ BID (WT < 150 kg, CrCl > 30 mL/min)


AND/OR


*Sequential Compression Device (SCD)


 


5 or more Highest Order ONE of the following medications:


*Heparin 5000 units SQ TID (Preferred with Epidurals)


*Enoxaparin/Lovenox 40 mg SQ daily (WT < 150 kg, CrCl > 30 mL/min)


*Enoxaparin/Lovenox 30 mg SQ daily (WT < 150 kg, CrCl > 10-29 mL/min)


*Enoxaparin/Lovenox 30 mg SQ BID (WT < 150 kg, CrCl > 30 mL/min)


AND


*Sequential Compression Device (SCD)











Assessment and Plan


Assessment and Plan


* Chest pain: Patient has had serial cardiac enzymes and EKGs for ruling out 

purposes.  She has been seen by Dr. Jose Moon of cardiology in the chest 

pain center.  Patient will have a Lexiscan myocardial perfusion stress test and 

be discharged at that stress test is nonischemic.  Patient should follow-up 

with PCP and return to ED for interval issues.


* Diabetes: We'll hold her medication at this time.  Will be on sliding scale 

coverage.  Follow diabetic diet.  Resume medication at discharge.


* Hypertension: Continue current medication.


* Hyperlipidemia: Continue current medication.


Patient is stable at this time.  She is agreeable to this plan.











Heath Toney Dec 18, 2017 08:34

## 2017-12-18 NOTE — RADRPT
EXAM DATE/TIME:  12/18/2017 09:16 

 

[facility] COMPARISON:     

No previous studies available for comparison.

 

 

INDICATIONS :     

Midsternal chest pain. Angina. 

                           

 

DOSE:     

27.3 mCi Tc99m Myoview at stress.

                     8.2 mCi Tc99m Myoview at rest.

                     0.4 mg Lexiscan

                       

 

 

STRESS SYMPTOMS:      

Chest pain and dyspnea.

                       

 

 

EJECTION FRACTION:       

46%

                       

 

MEDICAL HISTORY :     

Carcinoma, basal cell. Hypercholesterolemia. Hypertension. 

 

SURGICAL HISTORY :      

Hysterectomy.   Bilateral knee surgery.

 

ENCOUNTER:     

Initial

 

ACUITY:     

1 day

 

PAIN SCALE:     

3/10

 

LOCATION:      

Midsternal chest 

 

TECHNIQUE:     

The patient underwent pharmacologic stress with infusion of prescribed dose.  Continuous ECG tracing 
was monitored during stress.  Gated SPECT imaging was performed after stress and conventional SPECT i
maging was performed at rest.  The examination was performed on a SPECT/CT scanner, both attenuation 
and non-corrected datasets were reviewed.

 

FINDINGS:     

 

DISTRIBUTION:     

The maximum perfused segment at stress is in the inferior wall.

 

PERFUSION STUDY:     

The pattern of perfusion at stress is within normal limits.

 

GATED STUDY:     

There is intact wall motion and thickening without hypokinetic or dyskinetic segments. 

 

CONCLUSION:     

1. No stress-induced perfusion defect.

2. No focal wall motion abnormality with mild global hypokinesia. EF of 46%.

 

RISK CATEGORY:     Low (<1% Annual Mortality Rate)

 

 

 

 

 Santi Qiu MD on December 18, 2017 at 11:05           

Board Certified Radiologist.

 This report was verified electronically.

## 2017-12-19 NOTE — EKG
Date Performed: 2017       Time Performed: 03:33:56

 

PTAGE:      47 years

 

EKG:      SINUS BRADYCARDIA BORDERLINE ECG Since 

 

 PREVIOUS TRACING            , no significant change noted PREVIOUS TRACIN2017 00.40

 

DOCTOR:   Alondra Gloria  Interpretating Date/Time  2017 07:53:10

## 2017-12-19 NOTE — TR
Date Performed: 12/18/2017       Time Performed: 09:38:42

 

DOCTOR:      Alondra Gloria 

 

DRUG LIST:     

CLINICAL HISTORY:     

REASON FOR TEST:     

REASON FOR ENDING:     

OBSERVATION:     

CONCLUSION:      Lexiscan stress test was performed under standard four minute protocol.  Radionuclid
e was injected one minute prior to ending the test. No electrocardiographic abormalities were present
 to suggest ischemia. Nuclear imaging and interpretation are pending.

COMMENTS:

## 2017-12-19 NOTE — EKG
Date Performed: 2017       Time Performed: 20:34:22

 

PTAGE:      47 years

 

EKG:      Sinus rhythm 

 

 POSSIBLE LEFT ATRIAL ENLARGEMENT BORDERLINE ECG Since 

 

 PREVIOUS TRACING            , no significant change noted PREVIOUS TRACIN2001 22.44

 

DOCTOR:   Alondra Gloria  Interpretating Date/Time  2017 08:03:49

## 2017-12-19 NOTE — EKG
Date Performed: 2017       Time Performed: 00:40:28

 

PTAGE:      47 years

 

EKG:      SINUS BRADYCARDIA BORDERLINE ECG Since 

 

 PREVIOUS TRACING            , no significant change noted PREVIOUS TRACIN2017 00.39

 

DOCTOR:   Alondra Gloria  Interpretating Date/Time  2017 08:04:29

## 2018-02-24 ENCOUNTER — HOSPITAL ENCOUNTER (EMERGENCY)
Dept: HOSPITAL 17 - NEPD | Age: 48
Discharge: HOME | End: 2018-02-24
Payer: COMMERCIAL

## 2018-02-24 VITALS — HEIGHT: 67 IN | WEIGHT: 200.62 LBS | BODY MASS INDEX: 31.49 KG/M2

## 2018-02-24 VITALS
SYSTOLIC BLOOD PRESSURE: 127 MMHG | TEMPERATURE: 98.8 F | RESPIRATION RATE: 12 BRPM | DIASTOLIC BLOOD PRESSURE: 82 MMHG | HEART RATE: 74 BPM | OXYGEN SATURATION: 100 %

## 2018-02-24 DIAGNOSIS — N72: Primary | ICD-10-CM

## 2018-02-24 DIAGNOSIS — N39.0: ICD-10-CM

## 2018-02-24 DIAGNOSIS — E78.00: ICD-10-CM

## 2018-02-24 DIAGNOSIS — Z79.4: ICD-10-CM

## 2018-02-24 DIAGNOSIS — I10: ICD-10-CM

## 2018-02-24 DIAGNOSIS — E11.9: ICD-10-CM

## 2018-02-24 DIAGNOSIS — A59.9: ICD-10-CM

## 2018-02-24 LAB
AMORPHOUS SEDIMENT, URINE: (no result)
COLOR UR: YELLOW
GLUCOSE UR STRIP-MCNC: (no result) MG/DL
HGB UR QL STRIP: (no result)
KETONES UR STRIP-MCNC: (no result) MG/DL
MUCOUS THREADS #/AREA URNS LPF: (no result) /LPF
NITRITE UR QL STRIP: (no result)
SP GR UR STRIP: 1.02 (ref 1–1.03)
SQUAMOUS #/AREA URNS HPF: 1 /HPF (ref 0–5)
URINE LEUKOCYTE ESTERASE: (no result)

## 2018-02-24 PROCEDURE — 81001 URINALYSIS AUTO W/SCOPE: CPT

## 2018-02-24 PROCEDURE — 84703 CHORIONIC GONADOTROPIN ASSAY: CPT

## 2018-02-24 PROCEDURE — 99283 EMERGENCY DEPT VISIT LOW MDM: CPT

## 2018-02-24 PROCEDURE — 87210 SMEAR WET MOUNT SALINE/INK: CPT

## 2018-02-24 PROCEDURE — 87086 URINE CULTURE/COLONY COUNT: CPT

## 2018-02-24 PROCEDURE — 87491 CHLMYD TRACH DNA AMP PROBE: CPT

## 2018-02-24 PROCEDURE — 96372 THER/PROPH/DIAG INJ SC/IM: CPT

## 2018-02-24 PROCEDURE — 87591 N.GONORRHOEAE DNA AMP PROB: CPT

## 2018-02-24 NOTE — PD
HPI


Chief Complaint:  Gyn Problem/Complaint


Time Seen by Provider:  11:34


Travel History


International Travel<30 days:  No


Contact w/Intl Traveler<30days:  No


Traveled to known affect area:  No





History of Present Illness


HPI


47-year-old female presents to the emergency department with complaint of 

abnormal vaginal discharge 1 week.  Reports increased urination.  Denies 

dysuria, hematuria.  Reports vaginal itching.  Denies foul odor or lesions.  

Unknown exposure to STD.  Denies fever, vomiting, abdominal pain.  Has had 

partial hysterectomy and does not have periods.  Denies pain.  Saw her primary 

care provider and was given a prescription of antibiotics for urinary tract 

infection and then when she went back to her primary care provider her urine 

culture was negative.  She did take the entire prescription of antibiotics, but 

does not know the name of them.  Has tried Monistat for symptom treatment.  No 

known relieving or aggravating factors.  Symptoms are mild to moderate in 

severity.  Primary care provider is Paige.  History of diabetes mellitus, 

hypercholesterolemia, hypertension.  Allergies to morphine sulfate, Zofran, 

Steri-Strips.  Has no other medical complaints.  No other modifying factors or 

associated signs and symptoms.





PFSH


Past Medical History


Cancer:  Yes (MELANOMA 2002)


Cardiac Catheterization:  Yes (2016)


Cardiovascular Problems:  No


High Cholesterol:  Yes


Diabetes:  Yes


Patient Takes Glucophage:  Yes


Endocrine:  No


Gastrointestinal Disorders:  Yes (GERD)


GERD:  Yes


Genitourinary:  No


Hepatitis:  No


Hiatal Hernia:  No


Hypertension:  Yes


Immune Disorder:  No


Medical other:  No


Musculoskeletal:  No


Neurologic:  No


Psychiatric:  Yes (CLAUSTROPHOBIC)


Reproductive:  No


Respiratory:  No


Immunizations Current:  Yes


Thyroid Disease:  No


Pregnant?:  Not Pregnant





Past Surgical History


Abdominal Surgery:  No


AICD:  No


Cardiac Surgery:  No


Ear Surgery:  No


Endocrine Surgery:  No


Eye Surgery:  No


Genitourinary Surgery:  No


Gynecologic Surgery:  Yes (PARTIAL HYSTERECTOMY)


Hysterectomy:  Yes (PARTIAL)


Joint Replacement:  No


Neurologic Surgery:  No


Oral Surgery:  No


Pacemaker:  No


Thoracic Surgery:  No


Other Surgery:  Yes (CYST REMOVAL RIGHT WRIST)





Family History


Family Myocardial Infarction:  Yes (mother and father at ages 37, 45)





Social History


Alcohol Use:  No


Tobacco Use:  No


Substance Use:  No





Allergies-Medications


(Allergen,Severity, Reaction):  


Coded Allergies:  


     acetaminophen (Unverified  Allergy, Intermediate, VOMITTING, 12/17/17)


 PATIENT DENIES


     ondansetron (Unverified  Allergy, Intermediate, HIVES, 12/17/17)


     propoxyphene (Unverified  Allergy, Intermediate, VOMITTING, 12/17/17)


 PATIENT DENIES


     morphine (Unverified  Adverse Reaction, Mild, VOMITTING, 12/17/17)


Uncoded Allergies:  


     STERI STRIPS (Allergy, Severe, BLISTERS, 10/13/17)


Reported Meds & Prescriptions





Reported Meds & Active Scripts


Active


Keflex (Cephalexin) 500 Mg Cap 500 Mg PO Q12H 7 Days


Reported


Simvastatin 40 Mg Tab 40 Mg PO HS


Lantus Inj (Insulin Glargine) 1,000 Unit/10 Ml Vial 1 Units SQ HS


Metformin (Metformin HCl) 850 Mg Tab 850 Mg PO BIDPC


Metoprolol Tartrate 100 Mg Tab 100 Mg PO DAILY


Lisinopril 40 Mg Tab 40 Mg PO DAILY








Review of Systems


Except as stated in HPI:  all other systems reviewed are Neg





Physical Exam


Narrative


GENERAL: Well-nourished, well-developed black female patient, in no acute 

distress; afebrile, nontoxic-appearing


SKIN: Warm and dry.


HEAD: Atraumatic. Normocephalic. 


EYES: Pupils equal and round. No scleral icterus. No injection or drainage. 


ENT:  Mucous membranes pink and moist.


NECK: Trachea midline.  No lymphadenopathy.


CARDIOVASCULAR: Regular rate and rhythm.  No murmur appreciated.


RESPIRATORY: No accessory muscle use. Clear to auscultation. Breath sounds 

equal bilaterally. 


GASTROINTESTINAL: Abdomen soft, non-tender, nondistended.  Bilateral pelvic 

region nontender to palpation. Hepatic and splenic margins not palpable.  No 

guarding, rigidity, rebound tenderness. 


PELVIC:  Exam done in the presence of a nurse. Speculum exam reveals edematous 

and erythematous cervix with copious amounts of light green , mucopurulent, foul

-smelling discharge.  Bimanual exam reveals no palpable masses or adnexa 

tenderness, no uterine tenderness.  No cervical motion tenderness.  


BACK: No CVA tenderness.


MUSCULOSKELETAL: No obvious deformities. No clubbing.  No cyanosis.  No edema. 


NEUROLOGICAL: Awake and alert. No obvious cranial nerve deficits.  Motor 

grossly within normal limits. Normal speech.


PSYCHIATRIC: Appropriate mood and affect; insight and judgment normal.





Data


Data


Last Documented VS





Vital Signs








  Date Time  Temp Pulse Resp B/P (MAP) Pulse Ox O2 Delivery O2 Flow Rate FiO2


 


2/24/18 11:27 98.8 74 12 127/82 (97) 100 Room Air  








Orders





 Orders


Gc And Chlamydia Pcr (2/24/18 11:45)


Wet Prep Profile (2/24/18 11:45)


Urinalysis - C+S If Indicated (2/24/18 11:45)


Ed Urine Pregnancytest Poc (2/24/18 11:45)


Ceftriaxone Inj (Rocephin Inj) (2/24/18 12:30)


Lidocaine 1% Inj (50 Ml) (Xylocaine 1% I (2/24/18 12:30)


Azithromycin (Zithromax) (2/24/18 12:30)


Metronidazole (Flagyl) (2/24/18 13:15)


Urine Culture (2/24/18 13:20)


Ed Discharge Order (2/24/18 14:05)





Labs





Laboratory Tests








Test


  2/24/18


11:59 2/24/18


13:20


 


Clue Cells (Wet Prep) NONE SEEN  


 


Vaginal Trichomonas (Wet Prep) PRESENT  


 


Vaginal Yeast (Wet Prep) NONE SEEN  


 


Urine Color  YELLOW 


 


Urine Turbidity  HAZY 


 


Urine pH  5.5 


 


Urine Specific Gravity  1.019 


 


Urine Protein  TRACE mg/dL 


 


Urine Glucose (UA)  NEG mg/dL 


 


Urine Ketones  NEG mg/dL 


 


Urine Occult Blood  TRACE 


 


Urine Nitrite  NEG 


 


Urine Bilirubin  NEG 


 


Urine Urobilinogen


  


  LESS THAN 2.0


MG/DL


 


Urine Leukocyte Esterase  LARGE 


 


Urine RBC  8 /hpf 


 


Urine WBC  12 /hpf 


 


Urine Squamous Epithelial


Cells 


  1 /hpf 


 


 


Urine Amorphous Sediment  OCC 


 


Urine Mucus  FEW /lpf 


 


Microscopic Urinalysis Comment


  


  CULTURE


INDICATED











MDM


Medical Decision Making


Medical Screen Exam Complete:  Yes


Emergency Medical Condition:  Yes


Medical Record Reviewed:  Yes


Differential Diagnosis


Cervicitis, Trichomonas, bacterial vaginosis, UTI, PID, chlamydia, gonorrhea


Narrative Course


47-year-old female physical exam and pelvic exam consistent with cervicitis.  

Denies abdominal pain.  Patient is afebrile nontoxic pain.  She denies fever, 

vomiting.  Wet prep, chlamydia, gonorrhea, urinalysis ordered.  Patient 

empirically treated with Rocephin 250 mg IM and azithromycin 1000 mg in the 

emergency department.


1306: Vaginal yeast negative.  Bacterial vaginosis negative.  Positive for 

Trichomonas.  Flagyl 2 g ordered and administered in the ER.  


1404: Urinalysis with signs of infection.  Reflex to urine culture.  Keflex 

prescribed for home.  Instructed patient to follow up with primary care 

provider.  Instructed patient to follow-up with gynecology.  Patient provided 

community resources for outpatient follow-up.  Patient verbalizes understanding 

and agreement with treatment plan.  Patient is medically cleared and stable for 

discharge.  Discussed reasons to return to the emergency department.  Patient 

agrees with treatment plan.  The patients vital signs are stable and the 

patient is stable for outpatient follow-up and treatment.  Patient discharged 

home, stable and in no acute distress.





Diagnosis





 Primary Impression:  


 Cervicitis


 Additional Impressions:  


 Trichomoniasis


 UTI (urinary tract infection)


 Qualified Codes:  N39.0 - Urinary tract infection, site not specified


Referrals:  


[facility]





Primary Care Physician





[facility]


Patient Instructions:  Cervicitis (ED), General Instructions, Trichomoniasis (ED

)





***Additional Instructions:  


Avoid sexual activity for 14 days


No sexual activity with your partner/s until they have been treated and waited 

14 days


Inform all sexual partners within the past 3-6 months that they need to be 

evaluated and treated


Use condoms every time you have sex


Follow-up with primary care provider


Follow-up with gynecology


Return to the emergency department immediately with worsening of symptoms





Take antibiotics as prescribed and complete full course


Drink plenty of fluids


Maintain good personal hygiene


Follow-up with primary care provider


Return to the emergency department immediately with worsening of symptoms


***Med/Other Pt SpecificInfo:  Prescription(s) given


Scripts


Cephalexin (Keflex) 500 Mg Cap


500 MG PO Q12H for Infection for 7 Days, #14 CAP 0 Refills


   Prov: Airam Rush         2/24/18


Disposition:  01 DISCHARGE HOME


Condition:  Stable











Airam Rush Feb 24, 2018 12:28

## 2018-04-18 ENCOUNTER — HOSPITAL ENCOUNTER (EMERGENCY)
Dept: HOSPITAL 17 - NEPC | Age: 48
Discharge: HOME | End: 2018-04-18
Payer: COMMERCIAL

## 2018-04-18 VITALS — DIASTOLIC BLOOD PRESSURE: 97 MMHG | SYSTOLIC BLOOD PRESSURE: 150 MMHG | HEART RATE: 72 BPM

## 2018-04-18 VITALS
RESPIRATION RATE: 15 BRPM | HEART RATE: 69 BPM | OXYGEN SATURATION: 100 % | DIASTOLIC BLOOD PRESSURE: 83 MMHG | SYSTOLIC BLOOD PRESSURE: 139 MMHG

## 2018-04-18 VITALS
HEART RATE: 97 BPM | OXYGEN SATURATION: 96 % | DIASTOLIC BLOOD PRESSURE: 109 MMHG | TEMPERATURE: 98.6 F | SYSTOLIC BLOOD PRESSURE: 207 MMHG | RESPIRATION RATE: 16 BRPM

## 2018-04-18 VITALS — DIASTOLIC BLOOD PRESSURE: 97 MMHG | HEART RATE: 70 BPM | SYSTOLIC BLOOD PRESSURE: 150 MMHG

## 2018-04-18 VITALS — BODY MASS INDEX: 31.14 KG/M2 | HEIGHT: 67 IN | WEIGHT: 198.42 LBS

## 2018-04-18 VITALS
DIASTOLIC BLOOD PRESSURE: 101 MMHG | OXYGEN SATURATION: 100 % | RESPIRATION RATE: 18 BRPM | HEART RATE: 74 BPM | SYSTOLIC BLOOD PRESSURE: 137 MMHG

## 2018-04-18 DIAGNOSIS — Z79.4: ICD-10-CM

## 2018-04-18 DIAGNOSIS — E78.00: ICD-10-CM

## 2018-04-18 DIAGNOSIS — I10: ICD-10-CM

## 2018-04-18 DIAGNOSIS — Z79.899: ICD-10-CM

## 2018-04-18 DIAGNOSIS — E11.9: ICD-10-CM

## 2018-04-18 DIAGNOSIS — R07.9: Primary | ICD-10-CM

## 2018-04-18 LAB
ALBUMIN SERPL-MCNC: 4.1 GM/DL (ref 3.4–5)
ALP SERPL-CCNC: 80 U/L (ref 45–117)
ALT SERPL-CCNC: 24 U/L (ref 10–53)
AST SERPL-CCNC: 14 U/L (ref 15–37)
BASOPHILS # BLD AUTO: 0.1 TH/MM3 (ref 0–0.2)
BASOPHILS NFR BLD: 0.9 % (ref 0–2)
BILIRUB SERPL-MCNC: 0.3 MG/DL (ref 0.2–1)
BUN SERPL-MCNC: 13 MG/DL (ref 7–18)
CALCIUM SERPL-MCNC: 9.1 MG/DL (ref 8.5–10.1)
CHLORIDE SERPL-SCNC: 104 MEQ/L (ref 98–107)
CREAT SERPL-MCNC: 0.87 MG/DL (ref 0.5–1)
EOSINOPHIL # BLD: 0.1 TH/MM3 (ref 0–0.4)
EOSINOPHIL NFR BLD: 1.9 % (ref 0–4)
ERYTHROCYTE [DISTWIDTH] IN BLOOD BY AUTOMATED COUNT: 13 % (ref 11.6–17.2)
GFR SERPLBLD BASED ON 1.73 SQ M-ARVRAT: 83 ML/MIN (ref 89–?)
GLUCOSE SERPL-MCNC: 188 MG/DL (ref 74–106)
HCO3 BLD-SCNC: 29 MEQ/L (ref 21–32)
HCT VFR BLD CALC: 33.5 % (ref 35–46)
HGB BLD-MCNC: 11.1 GM/DL (ref 11.6–15.3)
INR PPP: 1 RATIO
LYMPHOCYTES # BLD AUTO: 2.7 TH/MM3 (ref 1–4.8)
LYMPHOCYTES NFR BLD AUTO: 48.3 % (ref 9–44)
MAGNESIUM SERPL-MCNC: 1.8 MG/DL (ref 1.5–2.5)
MCH RBC QN AUTO: 27.6 PG (ref 27–34)
MCHC RBC AUTO-ENTMCNC: 33 % (ref 32–36)
MCV RBC AUTO: 83.5 FL (ref 80–100)
MONOCYTE #: 0.3 TH/MM3 (ref 0–0.9)
MONOCYTES NFR BLD: 5.1 % (ref 0–8)
NEUTROPHILS # BLD AUTO: 2.5 TH/MM3 (ref 1.8–7.7)
NEUTROPHILS NFR BLD AUTO: 43.8 % (ref 16–70)
PLATELET # BLD: 367 TH/MM3 (ref 150–450)
PMV BLD AUTO: 7.2 FL (ref 7–11)
PROT SERPL-MCNC: 8.7 GM/DL (ref 6.4–8.2)
PROTHROMBIN TIME: 9.7 SEC (ref 9.8–11.6)
RBC # BLD AUTO: 4.01 MIL/MM3 (ref 4–5.3)
SODIUM SERPL-SCNC: 139 MEQ/L (ref 136–145)
TROPONIN I SERPL-MCNC: (no result) NG/ML (ref 0.02–0.05)
WBC # BLD AUTO: 5.6 TH/MM3 (ref 4–11)

## 2018-04-18 PROCEDURE — 85730 THROMBOPLASTIN TIME PARTIAL: CPT

## 2018-04-18 PROCEDURE — 82552 ASSAY OF CPK IN BLOOD: CPT

## 2018-04-18 PROCEDURE — 71045 X-RAY EXAM CHEST 1 VIEW: CPT

## 2018-04-18 PROCEDURE — 99284 EMERGENCY DEPT VISIT MOD MDM: CPT

## 2018-04-18 PROCEDURE — 93005 ELECTROCARDIOGRAM TRACING: CPT

## 2018-04-18 PROCEDURE — 83690 ASSAY OF LIPASE: CPT

## 2018-04-18 PROCEDURE — 80053 COMPREHEN METABOLIC PANEL: CPT

## 2018-04-18 PROCEDURE — 84484 ASSAY OF TROPONIN QUANT: CPT

## 2018-04-18 PROCEDURE — 85025 COMPLETE CBC W/AUTO DIFF WBC: CPT

## 2018-04-18 PROCEDURE — 83735 ASSAY OF MAGNESIUM: CPT

## 2018-04-18 PROCEDURE — 85610 PROTHROMBIN TIME: CPT

## 2018-04-18 PROCEDURE — 82550 ASSAY OF CK (CPK): CPT

## 2018-04-18 NOTE — PD
Physical Exam


Date Seen by Provider:  Apr 18, 2018


Time Seen by Provider:  13:36


Narrative


I resumed care from JAYDON Brewster.  Patient is a 48-year-old female who presents 

to the emergency department for evaluation of left-sided chest pain.  She 

states that started today while running errands.  She states that the pain did 

radiate to the left arm, but is not currently radiating.  Current pain is 6/10 

to the left chest.  She states it is sharp in nature.  She denies any 

alleviating or exacerbating factors.  Patient denies any recent surgery or 

travel.  She denies any leg edema.  No hemoptysis.  No history DVT or PE.  

Patient denies any cardiac history.  She denies any stress test or cardiac 

catheterization.  She does report history of hypertension, diabetes, 

hyperlipidemia.  Moderate severity.





GENERAL: Well-nourished, well-developed female patient, ambulatory.  Afebrile.


SKIN: Focused skin assessment warm/dry.


HEAD: Normocephalic.  Atraumatic.


EYES: No scleral icterus. No injection or drainage. 


NECK: Supple, trachea midline. No JVD or lymphadenopathy.


CARDIOVASCULAR: Regular rate and rhythm without murmurs, gallops, or rubs. 


RESPIRATORY: Breath sounds equal bilaterally. No accessory muscle use.  Lungs 

sounds are clear to auscultation.


GASTROINTESTINAL: Abdomen soft, non-tender, nondistended. 


MUSCULOSKELETAL: No cyanosis, or edema. 


BACK: Nontender without obvious deformity. No CVA tenderness.





Data


Data


Last Documented VS





Vital Signs








  Date Time  Temp Pulse Resp B/P (MAP) Pulse Ox O2 Delivery O2 Flow Rate FiO2


 


4/18/18 13:35      Room Air  


 


4/18/18 13:34  70  150/97 (114)    


 


4/18/18 10:58 98.6  16  96   








Orders





 Orders


Electrocardiogram (4/18/18 11:00)


Complete Blood Count With Diff (4/18/18 11:00)


Ckmb (Isoenzyme) Profile (4/18/18 11:00)


Troponin I (4/18/18 11:00)


Chest, Single Ap (4/18/18 11:00)


Comprehensive Metabolic Panel (4/18/18 11:59)


Magnesium (Mg) (4/18/18 11:59)


Prothrombin Time / Inr (Pt) (4/18/18 11:59)


Act Partial Throm Time (Ptt) (4/18/18 11:59)


Lipase (4/18/18 11:59)


Ecg Monitoring (4/18/18 11:59)


Bilateral Bp Monitoring (4/18/18 11:59)


Iv Access Insert/Monitor (4/18/18 11:59)


Oximetry (4/18/18 11:59)


Oxygen Administration (4/18/18 11:59)


Aspirin Chew (Aspirin Chew) (4/18/18 12:00)


Sodium Chloride 0.9% Flush (Ns Flush) (4/18/18 12:00)


Nitroglycerin Sl (Nitrostat Sl) (4/18/18 12:00)


Sodium Chlorid 0.9% 500 Ml Inj (Ns 500 M (4/18/18 12:00)


Vascular Access Team Consult/P PRN (4/18/18 12:01)


Vascular Poc Ultrasound (4/18/18 )


CKMB (4/18/18 12:10)


CKMB% (4/18/18 12:10)





Labs





Laboratory Tests








Test


  4/18/18


12:10


 


White Blood Count 5.6 TH/MM3 


 


Red Blood Count 4.01 MIL/MM3 


 


Hemoglobin 11.1 GM/DL 


 


Hematocrit 33.5 % 


 


Mean Corpuscular Volume 83.5 FL 


 


Mean Corpuscular Hemoglobin 27.6 PG 


 


Mean Corpuscular Hemoglobin


Concent 33.0 % 


 


 


Red Cell Distribution Width 13.0 % 


 


Platelet Count 367 TH/MM3 


 


Mean Platelet Volume 7.2 FL 


 


Neutrophils (%) (Auto) 43.8 % 


 


Lymphocytes (%) (Auto) 48.3 % 


 


Monocytes (%) (Auto) 5.1 % 


 


Eosinophils (%) (Auto) 1.9 % 


 


Basophils (%) (Auto) 0.9 % 


 


Neutrophils # (Auto) 2.5 TH/MM3 


 


Lymphocytes # (Auto) 2.7 TH/MM3 


 


Monocytes # (Auto) 0.3 TH/MM3 


 


Eosinophils # (Auto) 0.1 TH/MM3 


 


Basophils # (Auto) 0.1 TH/MM3 


 


CBC Comment DIFF FINAL 


 


Differential Comment  


 


Prothrombin Time 9.7 SEC 


 


Prothromb Time International


Ratio 1.0 RATIO 


 


 


Activated Partial


Thromboplast Time 27.1 SEC 


 


 


Blood Urea Nitrogen 13 MG/DL 


 


Creatinine 0.87 MG/DL 


 


Random Glucose 188 MG/DL 


 


Total Protein 8.7 GM/DL 


 


Albumin 4.1 GM/DL 


 


Calcium Level 9.1 MG/DL 


 


Magnesium Level 1.8 MG/DL 


 


Alkaline Phosphatase 80 U/L 


 


Aspartate Amino Transf


(AST/SGOT) 14 U/L 


 


 


Alanine Aminotransferase


(ALT/SGPT) 24 U/L 


 


 


Total Bilirubin 0.3 MG/DL 


 


Sodium Level 139 MEQ/L 


 


Potassium Level 3.9 MEQ/L 


 


Chloride Level 104 MEQ/L 


 


Carbon Dioxide Level 29.0 MEQ/L 


 


Anion Gap 6 MEQ/L 


 


Estimat Glomerular Filtration


Rate 83 ML/MIN 


 


 


Total Creatine Kinase 166 U/L 


 


Creatine Kinase MB


  LESS THAN 0.5


NG/ML


 


Troponin I


  LESS THAN 0.02


NG/ML


 


Lipase 119 U/L 











[facility]


Supervised Visit with ROSALINA:  No


Interpretation(s)





Last Impressions








Chest X-Ray 4/18/18 1100 Signed





Impressions: 





 Service Date/Time:  Wednesday, April 18, 2018 11:55 - CONCLUSION: No acute 





 disease.       David Gonzales MD  FACR








Differential Diagnosis


ACS versus chest wall pain versus anxiety versus pneumonia versus pneumothorax 

versus PE


Narrative Course


48-year-old female presents to the emergency department for evaluation of left-

sided chest pain that started today.  EKG shows sinus rhythm, heart rate 79, no 

acute ST changes.  CBC, CMP, CK, troponin, magnesium, lipase, PTT, PTT/INR were 

ordered and pending.  Patient was given aspirin 324 mg by mouth, nitroglycerin 

0.4 mg sublingually.  Patient was given normal saline 1 L IV bolus.  Chest x-

ray was ordered and pending.





CBC shows no acute abnormality.  CMP shows hyperglycemia of 188.  CK is 166.  

Troponin is less than 0.02.  Magnesium is 1.8.  Lipase is 119.  Coags show no 

acute abnormality.  Chest x-ray showed no acute disease.





I discussed admission to the chest pain center with the patient to rule out 

ACS.  She verbalizes agreement to this.  I rechecked her blood pressure which 

is now 150/97.





Upon further review, patient had stress test done on December 18, 2017 which 

showed low risk.  Delta troponin will be completed.  EKG shows Sinus rhythm, 

unchanged.  Repeat troponin is less than 0.02.





Patient will be discharged to follow-up with her primary care physician.  She 

verbalizes agreement.


Diagnosis





 Primary Impression:  


 Chest pain


 Qualified Codes:  R07.9 - Chest pain, unspecified


Referrals:  


Primary Care Physician


call for appointment


Patient Instructions:  Chest Pain (ED), General Instructions





***Additional Instruction:  


Follow-up with your primary care physician.


Return to the emergency department for any acute worsening of symptoms.


***Med/Other Pt SpecificInfo:  No Change to Meds


Disposition:  01 DISCHARGE HOME


Condition:  Stable











Nataly Vuong Apr 18, 2018 13:40

## 2018-04-18 NOTE — RADRPT
EXAM DATE/TIME:  04/18/2018 11:55 

 

[facility] COMPARISON:     

CHEST SINGLE AP, December 17, 2017, 21:00.

 

                     

INDICATIONS :     

Chest pain.

                     

 

MEDICAL HISTORY :     

None.          

 

SURGICAL HISTORY :     

None.   

 

ENCOUNTER:     

Initial                                        

 

ACUITY:     

1 day      

 

PAIN SCORE:     

5/10

 

LOCATION:     

Left upper chest 

 

FINDINGS:     

A single view of the chest demonstrates the lungs to be symmetrically aerated without evidence of mas
s, infiltrate or effusion.  The cardiomediastinal contours are unremarkable.  Osseous structures are 
intact.

 

CONCLUSION:     No acute disease.  

 

 

 

 David Gonzales MD FACR on April 18, 2018 at 12:05           

Board Certified Radiologist.

 This report was verified electronically.

## 2018-04-18 NOTE — PD
HPI


Chief Complaint:  Cardiac Complaint


Time Seen by Provider:  11:42


Travel History


International Travel<30 days:  No


Contact w/Intl Traveler<30days:  No


Traveled to known affect area:  No





History of Present Illness


HPI


Patient is a 48-year-old female presenting to the emergency department for 

evaluation of chest pain.  Patient states pain started right before she came to 

the emergency department.  She reports the pain is on her left anterior chest 

wall, it does not radiate to her back.  She reports mild nausea but denies any 

shortness of breath, headache, visual changes, abdominal pain.  Patient reports 

a history of hypertension, diabetes, hyperlipidemia.  She denies any previous 

heart attack.  Pain is pressure-like.  Symptom onset was sudden, symptoms are 

moderate in nature.  There are no alleviating factors.  Patient was not 

exerting herself when chest pain started she states she was just running 

errands.





PFSH


Past Medical History


Hx Anticoagulant Therapy:  No


Cancer:  Yes (MELANOMA 2002)


Cardiac Catheterization:  Yes (2016)


High Cholesterol:  Yes


Diabetes:  Yes


GERD:  Yes


Hypertension:  Yes


Psychiatric:  Yes (CLAUSTROPHOBIC)


Immunizations Current:  Yes





Past Surgical History


Abdominal Surgery:  No


AICD:  No


Cardiac Surgery:  No


Ear Surgery:  No


Endocrine Surgery:  No


Eye Surgery:  No


Genitourinary Surgery:  No


Gynecologic Surgery:  Yes (PARTIAL HYSTERECTOMY)


Hysterectomy:  Yes (PARTIAL)


Joint Replacement:  No


Neurologic Surgery:  No


Oral Surgery:  No


Pacemaker:  No


Thoracic Surgery:  No


Other Surgery:  Yes (CYST REMOVAL RIGHT WRIST)





Social History


Alcohol Use:  No


Tobacco Use:  No


Substance Use:  No





Allergies-Medications


(Allergen,Severity, Reaction):  


Coded Allergies:  


     acetaminophen (Unverified  Allergy, Intermediate, VOMITTING, 12/17/17)


 PATIENT DENIES


     ondansetron (Unverified  Allergy, Intermediate, HIVES, 12/17/17)


     propoxyphene (Unverified  Allergy, Intermediate, VOMITTING, 12/17/17)


 PATIENT DENIES


     morphine (Unverified  Adverse Reaction, Mild, VOMITTING, 12/17/17)


Uncoded Allergies:  


     STERI STRIPS (Allergy, Severe, BLISTERS, 10/13/17)


Reported Meds & Prescriptions





Reported Meds & Active Scripts


Active


Keflex (Cephalexin) 500 Mg Cap 500 Mg PO Q12H 7 Days


Reported


Simvastatin 40 Mg Tab 40 Mg PO HS


Lantus Inj (Insulin Glargine) 1,000 Unit/10 Ml Vial 1 Units SQ HS


Metformin (Metformin HCl) 850 Mg Tab 850 Mg PO BIDPC


Metoprolol Tartrate 100 Mg Tab 100 Mg PO DAILY


Lisinopril 40 Mg Tab 40 Mg PO DAILY








Review of Systems


Except as stated in HPI:  all other systems reviewed are Neg


Eyes:  No: Blurred Vision


HENT:  No: Headaches


Cardiovascular:  Positive: Chest Pain or Discomfort, No: Dyspnea on exertion


Respiratory:  No: Shortness of Breath


Gastrointestinal:  Positive: Nausea





Physical Exam


Narrative


GENERAL: Well-developed, well-nourished, alert -American female.  

Presenting in no acute distress.


SKIN: Warm and dry.


HEAD: Atraumatic. Normocephalic. 


EYES: Pupils equal and round. No scleral icterus. No injection or drainage. 


ENT: No nasal bleeding or discharge.  Mucous membranes pink and moist.


NECK: Trachea midline. No JVD. 


CARDIOVASCULAR: Regular rate and rhythm.  


RESPIRATORY: No accessory muscle use. Clear to auscultation. Breath sounds 

equal bilaterally. 


GASTROINTESTINAL: Abdomen soft, non-tender, nondistended. Hepatic and splenic 

margins not palpable. 


MUSCULOSKELETAL: Extremities without clubbing, cyanosis, or edema. No obvious 

deformities. 


NEUROLOGICAL: Awake and alert. No obvious cranial nerve deficits.  Motor 

grossly within normal limits. Five out of 5 muscle strength in the arms and 

legs.  Normal speech.


PSYCHIATRIC: Appropriate mood and affect; insight and judgment normal.





Data


Data


Last Documented VS





Vital Signs








  Date Time  Temp Pulse Resp B/P (MAP) Pulse Ox O2 Delivery O2 Flow Rate FiO2


 


4/18/18 10:58 98.6 97 16 207/109 (141) 96   








Orders





 Orders


Electrocardiogram (4/18/18 11:00)


Complete Blood Count With Diff (4/18/18 11:00)


Ckmb (Isoenzyme) Profile (4/18/18 11:00)


Troponin I (4/18/18 11:00)


Chest, Single Ap (4/18/18 11:00)


Comprehensive Metabolic Panel (4/18/18 11:59)


Magnesium (Mg) (4/18/18 11:59)


Prothrombin Time / Inr (Pt) (4/18/18 11:59)


Act Partial Throm Time (Ptt) (4/18/18 11:59)


Lipase (4/18/18 11:59)


Ecg Monitoring (4/18/18 11:59)


Bilateral Bp Monitoring (4/18/18 11:59)


Iv Access Insert/Monitor (4/18/18 11:59)


Oximetry (4/18/18 11:59)


Oxygen Administration (4/18/18 11:59)


Aspirin Chew (Aspirin Chew) (4/18/18 12:00)


Sodium Chloride 0.9% Flush (Ns Flush) (4/18/18 12:00)


Nitroglycerin Sl (Nitrostat Sl) (4/18/18 12:00)


Sodium Chlorid 0.9% 500 Ml Inj (Ns 500 M (4/18/18 12:00)


Vascular Access Team Consult/P PRN (4/18/18 12:01)


Vascular Poc Ultrasound (4/18/18 )





Labs





Laboratory Tests








Test


  4/18/18


12:10


 


White Blood Count 5.6 TH/MM3 


 


Red Blood Count 4.01 MIL/MM3 


 


Hemoglobin 11.1 GM/DL 


 


Hematocrit 33.5 % 


 


Mean Corpuscular Volume 83.5 FL 


 


Mean Corpuscular Hemoglobin 27.6 PG 


 


Mean Corpuscular Hemoglobin


Concent 33.0 % 


 


 


Red Cell Distribution Width 13.0 % 


 


Platelet Count 367 TH/MM3 


 


Mean Platelet Volume 7.2 FL 


 


Neutrophils (%) (Auto) 43.8 % 


 


Lymphocytes (%) (Auto) 48.3 % 


 


Monocytes (%) (Auto) 5.1 % 


 


Eosinophils (%) (Auto) 1.9 % 


 


Basophils (%) (Auto) 0.9 % 


 


Neutrophils # (Auto) 2.5 TH/MM3 


 


Lymphocytes # (Auto) 2.7 TH/MM3 


 


Monocytes # (Auto) 0.3 TH/MM3 


 


Eosinophils # (Auto) 0.1 TH/MM3 


 


Basophils # (Auto) 0.1 TH/MM3 


 


CBC Comment DIFF FINAL 


 


Differential Comment  


 


Prothrombin Time 9.7 SEC 


 


Prothromb Time International


Ratio 1.0 RATIO 


 


 


Activated Partial


Thromboplast Time 27.1 SEC 


 


 


Blood Urea Nitrogen 13 MG/DL 


 


Creatinine 0.87 MG/DL 


 


Random Glucose 188 MG/DL 


 


Total Protein 8.7 GM/DL 


 


Albumin 4.1 GM/DL 


 


Calcium Level 9.1 MG/DL 


 


Magnesium Level 1.8 MG/DL 


 


Alkaline Phosphatase 80 U/L 


 


Aspartate Amino Transf


(AST/SGOT) 14 U/L 


 


 


Alanine Aminotransferase


(ALT/SGPT) 24 U/L 


 


 


Total Bilirubin 0.3 MG/DL 


 


Sodium Level 139 MEQ/L 


 


Potassium Level 3.9 MEQ/L 


 


Chloride Level 104 MEQ/L 


 


Carbon Dioxide Level 29.0 MEQ/L 


 


Anion Gap 6 MEQ/L 


 


Estimat Glomerular Filtration


Rate 83 ML/MIN 


 


 


Lipase 119 U/L 











MDM


Medical Decision Making


Medical Screen Exam Complete:  Yes


Emergency Medical Condition:  Yes


Interpretation(s)





Vital Signs








  Date Time  Temp Pulse Resp B/P (MAP) Pulse Ox O2 Delivery O2 Flow Rate FiO2


 


4/18/18 10:58 98.6 97 16 207/109 (141) 96   








Differential Diagnosis


ACS versus [place] versus AMI versus chest wall pain versus GERD versus other


Narrative Course


Patient is a 48-year-old female presenting for evaluation of chest pain.  

Patient is hypertensive on arrival..  Labs and imaging ordered and pending.  

Vascular access consulted to place an IV..  Initial EKG shows normal sinus 

rhythm, this was reviewed by an attending physician. Care of patient 

transferred to Narcisa Moreland Apr 18, 2018 12:18

## 2018-04-19 NOTE — EKG
Date Performed: 2018       Time Performed: 11:21:34

 

PTAGE:      48 years

 

EKG:      Sinus rhythm 

 

 NORMAL ECG Since the 

 

 PREVIOUS TRACING            , no significant change noted PREVIOUS TRACIN2017 03.33

 

DOCTOR:   Fadi Noyola  Interpretating Date/Time  2018 13:31:13

## 2022-11-13 NOTE — EKG
Date Performed: 04/18/2018       Time Performed: 15:22:47

 

PTAGE:      48 years

 

EKG:      Sinus rhythm 

 

 NONSPECIFIC T-WAVE ABNORMALITY BORDERLINE ECG

 

PREVIOUS TRACING       : 04/18/2018 11.21 Since previous tracing, no significant change noted

 

DOCTOR:   Jose Moon  Interpretating Date/Time  04/19/2018 09:58:29 Male